# Patient Record
Sex: MALE | Race: WHITE | NOT HISPANIC OR LATINO | Employment: PART TIME | URBAN - METROPOLITAN AREA
[De-identification: names, ages, dates, MRNs, and addresses within clinical notes are randomized per-mention and may not be internally consistent; named-entity substitution may affect disease eponyms.]

---

## 2018-05-15 ENCOUNTER — OFFICE VISIT (OUTPATIENT)
Dept: UROLOGY | Facility: MEDICAL CENTER | Age: 73
End: 2018-05-15
Payer: MEDICARE

## 2018-05-15 VITALS
DIASTOLIC BLOOD PRESSURE: 76 MMHG | SYSTOLIC BLOOD PRESSURE: 120 MMHG | BODY MASS INDEX: 29.19 KG/M2 | HEIGHT: 67 IN | WEIGHT: 186 LBS

## 2018-05-15 DIAGNOSIS — N40.2 PROSTATE NODULE: ICD-10-CM

## 2018-05-15 DIAGNOSIS — N48.1 BALANITIS: ICD-10-CM

## 2018-05-15 DIAGNOSIS — N52.01 ERECTILE DYSFUNCTION DUE TO ARTERIAL INSUFFICIENCY: ICD-10-CM

## 2018-05-15 DIAGNOSIS — R97.20 ELEVATED PSA: Primary | ICD-10-CM

## 2018-05-15 LAB
SL AMB  POCT GLUCOSE, UA: 500
SL AMB LEUKOCYTE ESTERASE,UA: ABNORMAL
SL AMB POCT BILIRUBIN,UA: ABNORMAL
SL AMB POCT BLOOD,UA: ABNORMAL
SL AMB POCT CLARITY,UA: CLEAR
SL AMB POCT COLOR,UA: YELLOW
SL AMB POCT KETONES,UA: ABNORMAL
SL AMB POCT NITRITE,UA: ABNORMAL
SL AMB POCT PH,UA: ABNORMAL
SL AMB POCT SPECIFIC GRAVITY,UA: 5
SL AMB POCT URINE PROTEIN: 30
SL AMB POCT UROBILINOGEN: 0.2

## 2018-05-15 PROCEDURE — 99214 OFFICE O/P EST MOD 30 MIN: CPT | Performed by: UROLOGY

## 2018-05-15 PROCEDURE — 81003 URINALYSIS AUTO W/O SCOPE: CPT | Performed by: UROLOGY

## 2018-05-15 RX ORDER — FENOFIBRATE 145 MG/1
145 TABLET, COATED ORAL DAILY
COMMUNITY

## 2018-05-15 RX ORDER — LISINOPRIL AND HYDROCHLOROTHIAZIDE 12.5; 1 MG/1; MG/1
1 TABLET ORAL DAILY
COMMUNITY

## 2018-05-15 NOTE — PROGRESS NOTES
[address] [facility]  [facility]  [address] [facility] [place]  [place], [address] [facility]  [phone]  www  [facility]  org      NAME: Marga Briones  AGE: 68 y o  SEX: male  : 1945   MRN: [id number]    DATE: 5/15/2018  TIME: 3:56 PM    Assessment and Plan:  Elevated PSA status post biopsy 2016, which showed inflammation only  He also has a chronically suspicious digital rectal exam   We will check another PSA in 1 year  Continue use the triamcinolone as needed and avoid circumcision if possible  Chief Complaint   No chief complaint on file  History of Present Illness     had trus/bx 10/18/2016 for PSA of 7 3 - one focus of acute inflammation, one focus of high grade PIN  His PSA dropped to 1 2 before the bx  PSA down to 1 4 last OV, now 2 69  He has undergone TURP in   He has a history of balanitis and occasionally has trouble retracting the foreskin  He uses triamcinolone p r n  which seems to help  He inquired about circumcision, which I do not recommend if the triamcinolone is working for him  Erectile dysfunction:  Viagra is too expensive, and it was not working very well last time used  He has come to terms with this and does not require further treatment  Urinalysis is negative except for 500 glucose  The following portions of the patient's history were reviewed and updated as appropriate: allergies, current medications, past family history, past medical history, past social history, past surgical history and problem list     Review of Systems   Review of Systems   Genitourinary: Negative  Active Problem List   There is no problem list on file for this patient  Objective   /76   Ht 5' 7" (1 702 m)   Wt 84 4 kg (186 lb)   BMI 29 13 kg/m²     Physical Exam   Constitutional: He is oriented to person, place, and time  He appears well-developed and well-nourished     HENT:   Head: Normocephalic and atraumatic  Eyes: EOM are normal    Neck: Normal range of motion  Pulmonary/Chest: Effort normal    Genitourinary:   Genitourinary Comments: On this year's exam, the right side of his prostate is no longer as firm as it was before  However he has a left-sided nodule that actually feels like calcification  Otherwise normal    Musculoskeletal: Normal range of motion  Neurological: He is alert and oriented to person, place, and time  Skin: Skin is warm and dry  Psychiatric: He has a normal mood and affect   His behavior is normal  Judgment and thought content normal            Current Medications     Current Outpatient Prescriptions:     fenofibrate (TRICOR) 145 mg tablet, Take 145 mg by mouth daily, Disp: , Rfl:     lisinopril-hydrochlorothiazide (PRINZIDE,ZESTORETIC) 10-12 5 MG per tablet, Take 1 tablet by mouth daily, Disp: , Rfl:     metFORMIN (GLUCOPHAGE) 500 mg tablet, , Disp: , Rfl:         Ortiz Dorado MD

## 2018-05-15 NOTE — LETTER
May 15, 2018     Shlomo Prasad MD  [address]    Patient: Eduar Hartley   YOB: 1945   Date of Visit: 5/15/2018       Dear Dr Maru Werner: Thank you for referring Selene Gracia to me for evaluation  Below are my notes for this consultation  If you have questions, please do not hesitate to call me  I look forward to following your patient along with you  Sincerely,        Rodrigo Barger MD        CC: No Recipients  Rodrigo Barger MD  5/15/2018  4:08 PM  Sign at close encounter  [address] [facility]  [address], [address] [place] [id number]  www  [facility]  org      NAME: Eduar Hartley  AGE: 68 y o  SEX: male  : 1945   MRN: [id number]    DATE: 5/15/2018  TIME: 3:56 PM    Assessment and Plan:  Elevated PSA status post biopsy 2016, which showed inflammation only  He also has a chronically suspicious digital rectal exam   We will check another PSA in 1 year  Chief Complaint   No chief complaint on file  History of Present Illness     had trus/bx 10/18/2016 for PSA of 7 3 - one focus of acute inflammation, one focus of high grade PIN  His PSA dropped to 1 2 before the bx  PSA down to 1 4 last OV, now 2 69  He has undergone TURP in   Urinalysis is negative except for 500 glucose  The following portions of the patient's history were reviewed and updated as appropriate: allergies, current medications, past family history, past medical history, past social history, past surgical history and problem list     Review of Systems   Review of Systems   Genitourinary: Negative  Active Problem List   There is no problem list on file for this patient  Objective   /76   Ht 5' 7" (1 702 m)   Wt 84 4 kg (186 lb)   BMI 29 13 kg/m²      Physical Exam   Constitutional: He is oriented to person, place, and time   He appears well-developed and well-nourished  HENT:   Head: Normocephalic and atraumatic  Eyes: EOM are normal    Neck: Normal range of motion  Pulmonary/Chest: Effort normal    Genitourinary:   Genitourinary Comments: On this year's exam, the right side of his prostate is no longer as firm as it was before  However he has a left-sided nodule that actually feels like calcification  Otherwise normal    Musculoskeletal: Normal range of motion  Neurological: He is alert and oriented to person, place, and time  Skin: Skin is warm and dry  Psychiatric: He has a normal mood and affect   His behavior is normal  Judgment and thought content normal            Current Medications     Current Outpatient Prescriptions:     fenofibrate (TRICOR) 145 mg tablet, Take 145 mg by mouth daily, Disp: , Rfl:     lisinopril-hydrochlorothiazide (PRINZIDE,ZESTORETIC) 10-12 5 MG per tablet, Take 1 tablet by mouth daily, Disp: , Rfl:     metFORMIN (GLUCOPHAGE) 500 mg tablet, , Disp: , Rfl:         Karen Gagnon MD

## 2019-05-20 ENCOUNTER — OFFICE VISIT (OUTPATIENT)
Dept: UROLOGY | Facility: MEDICAL CENTER | Age: 74
End: 2019-05-20
Payer: MEDICARE

## 2019-05-20 VITALS
DIASTOLIC BLOOD PRESSURE: 70 MMHG | WEIGHT: 183 LBS | HEART RATE: 72 BPM | BODY MASS INDEX: 28.72 KG/M2 | HEIGHT: 67 IN | SYSTOLIC BLOOD PRESSURE: 122 MMHG

## 2019-05-20 DIAGNOSIS — N13.8 BPH WITH OBSTRUCTION/LOWER URINARY TRACT SYMPTOMS: ICD-10-CM

## 2019-05-20 DIAGNOSIS — N48.1 BALANITIS: ICD-10-CM

## 2019-05-20 DIAGNOSIS — R68.89 ABNORMAL DIGITAL RECTAL EXAM: ICD-10-CM

## 2019-05-20 DIAGNOSIS — R97.20 ELEVATED PSA: Primary | ICD-10-CM

## 2019-05-20 DIAGNOSIS — Z87.898 HISTORY OF ELEVATED PSA: ICD-10-CM

## 2019-05-20 DIAGNOSIS — N40.1 BPH WITH OBSTRUCTION/LOWER URINARY TRACT SYMPTOMS: ICD-10-CM

## 2019-05-20 DIAGNOSIS — R31.29 MICROSCOPIC HEMATURIA: ICD-10-CM

## 2019-05-20 LAB
SL AMB  POCT GLUCOSE, UA: ABNORMAL
SL AMB LEUKOCYTE ESTERASE,UA: ABNORMAL
SL AMB POCT BILIRUBIN,UA: ABNORMAL
SL AMB POCT BLOOD,UA: ABNORMAL
SL AMB POCT CLARITY,UA: CLEAR
SL AMB POCT COLOR,UA: YELLOW
SL AMB POCT KETONES,UA: ABNORMAL
SL AMB POCT NITRITE,UA: ABNORMAL
SL AMB POCT PH,UA: 5
SL AMB POCT SPECIFIC GRAVITY,UA: 1.01
SL AMB POCT URINE PROTEIN: ABNORMAL
SL AMB POCT UROBILINOGEN: 0.2

## 2019-05-20 PROCEDURE — 99214 OFFICE O/P EST MOD 30 MIN: CPT | Performed by: UROLOGY

## 2019-05-20 PROCEDURE — 81003 URINALYSIS AUTO W/O SCOPE: CPT | Performed by: UROLOGY

## 2019-05-20 RX ORDER — BLOOD SUGAR DIAGNOSTIC
STRIP MISCELLANEOUS
Refills: 0 | COMMUNITY
Start: 2019-03-15

## 2019-05-20 RX ORDER — NYSTATIN AND TRIAMCINOLONE ACETONIDE 100000; 1 [USP'U]/G; MG/G
OINTMENT TOPICAL 2 TIMES DAILY
Qty: 30 G | Refills: 0 | Status: SHIPPED | OUTPATIENT
Start: 2019-05-20 | End: 2020-05-26 | Stop reason: SDUPTHER

## 2019-05-22 ENCOUNTER — HOSPITAL ENCOUNTER (OUTPATIENT)
Dept: ULTRASOUND IMAGING | Facility: MEDICAL CENTER | Age: 74
Discharge: HOME | End: 2019-05-22
Attending: UROLOGY
Payer: MEDICARE

## 2019-05-22 DIAGNOSIS — R31.29 MICROSCOPIC HEMATURIA: ICD-10-CM

## 2019-05-22 PROCEDURE — 76770 US EXAM ABDO BACK WALL COMP: CPT

## 2019-05-28 ENCOUNTER — TELEPHONE (OUTPATIENT)
Dept: UROLOGY | Facility: MEDICAL CENTER | Age: 74
End: 2019-05-28

## 2020-05-26 ENCOUNTER — TELEMEDICINE (OUTPATIENT)
Dept: UROLOGY | Facility: MEDICAL CENTER | Age: 75
End: 2020-05-26
Payer: MEDICARE

## 2020-05-26 DIAGNOSIS — R97.20 ELEVATED PSA: Primary | ICD-10-CM

## 2020-05-26 DIAGNOSIS — N48.1 BALANITIS: ICD-10-CM

## 2020-05-26 PROCEDURE — 99441 PR PHYS/QHP TELEPHONE EVALUATION 5-10 MIN: CPT | Performed by: UROLOGY

## 2020-05-26 RX ORDER — NYSTATIN AND TRIAMCINOLONE ACETONIDE 100000; 1 [USP'U]/G; MG/G
OINTMENT TOPICAL 2 TIMES DAILY
Qty: 30 G | Refills: 0 | Status: SHIPPED | OUTPATIENT
Start: 2020-05-26

## 2021-04-16 ENCOUNTER — TELEPHONE (OUTPATIENT)
Dept: UROLOGY | Facility: AMBULATORY SURGERY CENTER | Age: 76
End: 2021-04-16

## 2021-04-16 DIAGNOSIS — R97.20 ELEVATED PSA: Primary | ICD-10-CM

## 2021-04-16 NOTE — TELEPHONE ENCOUNTER
Patient managed by Dr Mercedez Willson ([place]) patient called in to schedule his yearly fup (7/12/21) patient will need an updated psa  Patient requested script to be mailed to his home   Patient can be reached at [phone]

## 2021-07-12 ENCOUNTER — OFFICE VISIT (OUTPATIENT)
Dept: UROLOGY | Facility: MEDICAL CENTER | Age: 76
End: 2021-07-12
Payer: MEDICARE

## 2021-07-12 VITALS
HEIGHT: 67 IN | SYSTOLIC BLOOD PRESSURE: 112 MMHG | WEIGHT: 180 LBS | DIASTOLIC BLOOD PRESSURE: 72 MMHG | BODY MASS INDEX: 28.25 KG/M2

## 2021-07-12 DIAGNOSIS — R97.20 ELEVATED PROSTATE SPECIFIC ANTIGEN (PSA): ICD-10-CM

## 2021-07-12 DIAGNOSIS — Z87.898 HISTORY OF ELEVATED PSA: ICD-10-CM

## 2021-07-12 DIAGNOSIS — R97.20 ELEVATED PSA: Primary | ICD-10-CM

## 2021-07-12 LAB
SL AMB  POCT GLUCOSE, UA: NORMAL
SL AMB LEUKOCYTE ESTERASE,UA: NORMAL
SL AMB POCT BILIRUBIN,UA: NORMAL
SL AMB POCT BLOOD,UA: NORMAL
SL AMB POCT CLARITY,UA: CLEAR
SL AMB POCT COLOR,UA: YELLOW
SL AMB POCT KETONES,UA: NORMAL
SL AMB POCT NITRITE,UA: NORMAL
SL AMB POCT PH,UA: 5
SL AMB POCT SPECIFIC GRAVITY,UA: >=1.03
SL AMB POCT URINE PROTEIN: NORMAL
SL AMB POCT UROBILINOGEN: 0.2

## 2021-07-12 PROCEDURE — 99214 OFFICE O/P EST MOD 30 MIN: CPT | Performed by: UROLOGY

## 2021-07-12 PROCEDURE — 81003 URINALYSIS AUTO W/O SCOPE: CPT | Performed by: UROLOGY

## 2021-07-12 RX ORDER — ATORVASTATIN CALCIUM 80 MG/1
80 TABLET, FILM COATED ORAL DAILY
COMMUNITY
Start: 2021-05-24

## 2021-07-12 NOTE — PROGRESS NOTES
[address] [facility]  [facility]  [address] [facility], [address] [place] [address]  [phone]  www  [facility]  org      NAME: Drew Montanez  AGE: 68 y o  SEX: male  : 1945   MRN: [id number]    DATE: 2021  TIME: 10:24 AM    Assessment and Plan:    History of elevated PSA, normalized and stable  Chronically abnormal digital rectal exam no change  Status post negative biopsy 2016  Voiding well  From my standpoint he is doing well  Recheck in 2 years  Chief Complaint   No chief complaint on file  History of Present Illness     Follow-up history of elevated PSA that has normalized- also has a chronically abnormal digital rectal exam   Status post negative biopsy 2016  Also has a history of BPH with obstruction status post TURP in   PSA was 2 22 2020, PSA was 2 73 as of 2021  Also history history of balanoposthitis which he uses Mycolog cream occasionally 4  He has not had a problem with balanoposthitis recently  Has some urgency but no frequency  AUA score is 5  Quality of life is 1  The following portions of the patient's history were reviewed and updated as appropriate: allergies, current medications, past family history, past medical history, past social history, past surgical history and problem list   Past Medical History:   Diagnosis Date    Balanoposthitis     BPH without obstruction/lower urinary tract symptoms     Elevated PSA     Nodular prostate without lower urinary tract symptoms      Past Surgical History:   Procedure Laterality Date    PROSTATE BIOPSY      TRANSURETHRAL RESECTION OF PROSTATE  2007     shoulder  Review of Systems   Review of Systems   Constitutional: Negative for fever  Respiratory: Negative for shortness of breath  Cardiovascular: Negative for chest pain  Genitourinary: Positive for urgency  Negative for difficulty urinating, dysuria and frequency         Active Problem List   There is no problem list on file for this patient  Objective   /72   Ht 5' 7" (1 702 m)   Wt 81 6 kg (180 lb)   BMI 28 19 kg/m²     Physical Exam  Constitutional:       Appearance: Normal appearance  HENT:      Head: Normocephalic and atraumatic  Eyes:      Extraocular Movements: Extraocular movements intact  Pulmonary:      Effort: Pulmonary effort is normal    Genitourinary:     Comments: Uncircumcised phallus, with some erythema consistent with chronic balanitis  No suspicious lesions  Testicles are descended bilaterally and are within normal limits  Rectal exam reveals normal tone no masses and his prostate is about 40-50 g, with right-sided asymmetry nodularity on the right which is chronic and unchanged  Musculoskeletal:         General: Normal range of motion  Cervical back: Normal range of motion  Skin:     Coloration: Skin is not jaundiced or pale  Neurological:      General: No focal deficit present  Mental Status: He is alert and oriented to person, place, and time  Psychiatric:         Mood and Affect: Mood normal          Behavior: Behavior normal          Thought Content:  Thought content normal          Judgment: Judgment normal              Current Medications     Current Outpatient Medications:     atorvastatin (LIPITOR) 80 mg tablet, Take 80 mg by mouth daily, Disp: , Rfl:     fenofibrate (TRICOR) 145 mg tablet, Take 145 mg by mouth daily, Disp: , Rfl:     FREESTYLE TEST STRIPS test strip, , Disp: , Rfl: 0    lisinopril-hydrochlorothiazide (PRINZIDE,ZESTORETIC) 10-12 5 MG per tablet, Take 1 tablet by mouth daily, Disp: , Rfl:     metFORMIN (GLUCOPHAGE) 500 mg tablet, , Disp: , Rfl:     nystatin-triamcinolone (MYCOLOG-II) ointment, Apply topically 2 (two) times a day (Patient taking differently: Apply topically as needed ), Disp: 30 g, Rfl: 0        Pernell Burns MD

## 2023-01-06 ENCOUNTER — TELEPHONE (OUTPATIENT)
Dept: OTHER | Facility: OTHER | Age: 78
End: 2023-01-06

## 2023-01-06 NOTE — TELEPHONE ENCOUNTER
Pt called in to set up an appt  He says he can't hold his urine too well and he leaks a little bit  He is requesting a call back

## 2023-01-06 NOTE — TELEPHONE ENCOUNTER
Spoke with pt and scheduled appt for 1-19-23 at 8:30am with dr Delta Rocha at [facility] office  Pt of dr Milvia Junior but pt does not want to travel to [facility]

## 2023-01-19 ENCOUNTER — OFFICE VISIT (OUTPATIENT)
Dept: UROLOGY | Facility: MEDICAL CENTER | Age: 78
End: 2023-01-19

## 2023-01-19 VITALS
HEIGHT: 67 IN | OXYGEN SATURATION: 99 % | HEART RATE: 64 BPM | BODY MASS INDEX: 28.19 KG/M2 | DIASTOLIC BLOOD PRESSURE: 74 MMHG | WEIGHT: 179.6 LBS | SYSTOLIC BLOOD PRESSURE: 126 MMHG

## 2023-01-19 DIAGNOSIS — N48.1 BALANITIS: ICD-10-CM

## 2023-01-19 DIAGNOSIS — R97.20 ELEVATED PSA: ICD-10-CM

## 2023-01-19 DIAGNOSIS — R39.15 URINARY URGENCY: Primary | ICD-10-CM

## 2023-01-19 LAB
BACTERIA UR QL AUTO: ABNORMAL /HPF
BILIRUB UR QL STRIP: NEGATIVE
CLARITY UR: CLEAR
COLOR UR: YELLOW
GLUCOSE UR STRIP-MCNC: ABNORMAL MG/DL
HGB UR QL STRIP.AUTO: ABNORMAL
KETONES UR STRIP-MCNC: ABNORMAL MG/DL
LEUKOCYTE ESTERASE UR QL STRIP: NEGATIVE
NITRITE UR QL STRIP: NEGATIVE
NON-SQ EPI CELLS URNS QL MICRO: ABNORMAL /HPF
PH UR STRIP.AUTO: 5.5 [PH]
POST-VOID RESIDUAL VOLUME, ML POC: 20 ML
PROT UR STRIP-MCNC: ABNORMAL MG/DL
RBC #/AREA URNS AUTO: ABNORMAL /HPF
SP GR UR STRIP.AUTO: >=1.03 (ref 1–1.03)
UROBILINOGEN UR STRIP-ACNC: <2 MG/DL
WBC #/AREA URNS AUTO: ABNORMAL /HPF

## 2023-01-19 RX ORDER — GLIMEPIRIDE 2 MG/1
2 TABLET ORAL
COMMUNITY

## 2023-01-19 RX ORDER — MIRABEGRON 25 MG/1
25 TABLET, FILM COATED, EXTENDED RELEASE ORAL DAILY
Qty: 30 TABLET | Refills: 11 | Status: SHIPPED | OUTPATIENT
Start: 2023-01-19

## 2023-01-19 RX ORDER — NYSTATIN AND TRIAMCINOLONE ACETONIDE 100000; 1 [USP'U]/G; MG/G
OINTMENT TOPICAL 2 TIMES DAILY
Qty: 30 G | Refills: 0 | Status: SHIPPED | OUTPATIENT
Start: 2023-01-19

## 2023-01-19 NOTE — PROGRESS NOTES
1/19/2023    Pato Schulte  1945  [id number]      Chief Complaint: Follow-up for urinary urgency    History of Present Illness  Pato Schulte is a 68 y o  male previously followed with Dr Jefferson Sawant presenting for evaluation of urinary urgency with incontinence  He was last seen by Dr Jefferson Sawant in July 2021  He has a history of elevated PSA that essentially normalized  He had a negative prostate biopsy in October 2016  PSAs has been in the high twos most recently  He has a history of nocturia in 2007  He has had increasing urgency and is not able to make it to the bathroom if he deteriorates urination  He will have an incontinence episode if he does not go to the bathroom immediately  He also endorses postvoid dribbling  He has urinary frequency but denies dysuria or gross hematuria  He has nocturia 1-2 times  Denies history of recent infections  He drinks a lot of diet [place] [place] iced tea  He states that he drinks more of this than water  Last PSA was 2 99 from June 2022  Bladder Scan PVR: 20mL    AUA SYMPTOM SCORE    Flowsheet Row Most Recent Value   AUA SYMPTOM SCORE    How often have you had a sensation of not emptying your bladder completely after you finished urinating? 4   How often have you had to urinate again less than two hours after you finished urinating? 3   How often have you found you stopped and started again several times when you urinate? 1   How often have you found it difficult to postpone urination? 5   How often have you had a weak urinary stream? 2   How often have you had to push or strain to begin urination? 2   How many times did you most typically get up to urinate from the time you went to bed at night until the time you got up in the morning?  2   Quality of Life: If you were to spend the rest of your life with your urinary condition just the way it is now, how would you feel about that? 3   AUA SYMPTOM SCORE 19          Past Medical History  Past Medical History: Diagnosis Date   • Balanoposthitis    • BPH without obstruction/lower urinary tract symptoms    • Elevated PSA    • Nodular prostate without lower urinary tract symptoms        Past Surgical History  Past Surgical History:   Procedure Laterality Date   • PROSTATE BIOPSY     • TRANSURETHRAL RESECTION OF PROSTATE  2007       Physical Exam  /74 (BP Location: Left arm, Patient Position: Sitting, Cuff Size: Adult)   Pulse 64   Ht 5' 7" (1 702 m)   Wt 81 5 kg (179 lb 9 6 oz)   SpO2 99%   BMI 28 13 kg/m²     General:  Healthy appearing male in no acute distress  Head:  Normocephalic, atraumatic  Cardiovascular:  Regular rate  Respiratory:  Patient has unlabored respirations  Assessment  61-year-old male with history of BPH status post TURP in 2007, urinary urgency with urgency incontinence, history of intermittent balanoposthitis, and previous history of elevated PSA  His AUA symptom score is 19  Urgency incontinence is most bothersome to him at the moment he would like this addressed  He is emptying his bladder well  I advised that iced tea is caffeinated and may be contributing to his urinary urgency given that he is drinking so much tea  I recommend that he reduce his intake to help with the symptoms  I also recommend that he trial Myrbetriq 25 mg daily  The side effect profile is much more tolerable  I do not want to use anticholinergics in the patient his age given the side effects of constipation and concern for possible dementia  The patient reports intermittent bladder posthitis  He is not currently having any issues but would like to have a prescription refill so that this medication is available to him  He is due for PSA in June  I will see him back then to discuss his symptoms    Plan  1  Prescription for Myrbetriq 25 mg daily sent to pharmacy  2  Refill for Mycolog-II sent to pharmacy  3  Due for PSA June 2023  4   Return to clinic in 5 months with a PSA prior    Patrizia Renetta Vanessa, [facility]    Portions of the above record have been created with voice recognition software  Occasional wrong word or "sound alike" substitution may have occurred due to the inherent limitations of voice recognition software  Please read the chart carefully and recognize, using context, where substitution may have occurred  For further clarification, please contact me directly

## 2023-01-20 LAB — BACTERIA UR CULT: NORMAL

## 2023-06-22 ENCOUNTER — OFFICE VISIT (OUTPATIENT)
Dept: UROLOGY | Facility: MEDICAL CENTER | Age: 78
End: 2023-06-22
Payer: MEDICARE

## 2023-06-22 VITALS
BODY MASS INDEX: 27.5 KG/M2 | HEART RATE: 73 BPM | HEIGHT: 67 IN | OXYGEN SATURATION: 100 % | DIASTOLIC BLOOD PRESSURE: 76 MMHG | WEIGHT: 175.2 LBS | SYSTOLIC BLOOD PRESSURE: 120 MMHG

## 2023-06-22 DIAGNOSIS — N39.41 BENIGN PROSTATIC HYPERPLASIA (BPH) WITH URINARY URGE INCONTINENCE: Primary | ICD-10-CM

## 2023-06-22 DIAGNOSIS — N40.1 BENIGN PROSTATIC HYPERPLASIA (BPH) WITH URINARY URGE INCONTINENCE: Primary | ICD-10-CM

## 2023-06-22 DIAGNOSIS — Z12.5 PROSTATE CANCER SCREENING: ICD-10-CM

## 2023-06-22 PROBLEM — R39.15 URINARY URGENCY: Status: ACTIVE | Noted: 2023-06-22

## 2023-06-22 LAB — POST-VOID RESIDUAL VOLUME, ML POC: 0 ML

## 2023-06-22 PROCEDURE — 51798 US URINE CAPACITY MEASURE: CPT | Performed by: STUDENT IN AN ORGANIZED HEALTH CARE EDUCATION/TRAINING PROGRAM

## 2023-06-22 PROCEDURE — 99214 OFFICE O/P EST MOD 30 MIN: CPT | Performed by: STUDENT IN AN ORGANIZED HEALTH CARE EDUCATION/TRAINING PROGRAM

## 2023-06-22 NOTE — PROGRESS NOTES
Urology Ambulatory Progress Note  6/22/2023    Dana Gusman  1945  [id number]      Assessment/Plan:  Problem List Items Addressed This Visit        Other    Prostate cancer screening     Assessment:  I discussed with the patient continued prostate cancer screening with annual PSA versus discontinuing PSA checks given his age and persistent stability of PSA  He prefers to continue monitoring which I think is reasonable given his good state of health at his age  Plan:  • Return to clinic in 1 year with PSA prior  Relevant Orders    PSA, Total Screen    Urinary urgency - Primary     Assessment:  BPH with lower urinary tract symptoms predominantly irritative  Patient is doing well with behavioral modifications and has not required pharmacologic therapy  He is satisfied with his urinary pattern  I think we can switch to annual follow-up  Plan:  • Return to clinic in 1 year                Chief Complaint: Follow-up for BPH with LUTS and prostate cancer screening    History of Present Illness  Dana Gusman is a 66 y o  male presenting for evaluation of BPH/LUTS and prostate cancer screening  He has a history of elevated PSA that essentially normalized  He had a negative prostate biopsy in October 2016  PSAs has been in the high twos most recently  He has a history of nocturia and underwnet TURP in 2007  During my visit with him in January 2023 he was complaining of urgency, urgency incontinence, and nocturia  I prescribed Myrbetriq to help with irritative voiding symptoms and he returns to discuss his symptoms  He has significantly decreased his intake of [place] citizen [place] iced tea and his symptoms have markedly improved  He no longer has incontinence  He never took Myrbetriq because this was cost prohibitive  He is satisfied with his urinary pattern at present  PSA June 8, 2023 returned at 2 27 which is in range with prior PSA values  Previous PSA was 2 99 from June 2022         Past Medical "History  Past Medical History:   Diagnosis Date   • Balanoposthitis    • BPH without obstruction/lower urinary tract symptoms    • Elevated PSA    • Nodular prostate without lower urinary tract symptoms        Past Surgical History  Past Surgical History:   Procedure Laterality Date   • PROSTATE BIOPSY     • TRANSURETHRAL RESECTION OF PROSTATE  2007       Physical Exam  /76 (BP Location: Left arm, Patient Position: Sitting, Cuff Size: Adult)   Pulse 73   Ht 5' 7\" (1 702 m)   Wt 79 5 kg (175 lb 3 2 oz)   SpO2 100%   BMI 27 44 kg/m²     General:  Healthy appearing male in no acute distress  Head:  Normocephalic, atraumatic  Cardiovascular:  Regular rate  Respiratory:  Patient has unlabored respirations  Elvin Gill MD  [facility]    Portions of the above record have been created with voice recognition software  Occasional wrong word or \"sound alike\" substitution may have occurred due to the inherent limitations of voice recognition software  Please read the chart carefully and recognize, using context, where substitution may have occurred  For further clarification, please contact me directly     "

## 2023-06-22 NOTE — ASSESSMENT & PLAN NOTE
01/06/22 1:08 PM     See documentation in the [employer] Smart[employer]       Carrie Mcnamara Assessment:  BPH with lower urinary tract symptoms predominantly irritative  Patient is doing well with behavioral modifications and has not required pharmacologic therapy  He is satisfied with his urinary pattern  I think we can switch to annual follow-up      Plan:  • Return to clinic in 1 year

## 2023-06-22 NOTE — ASSESSMENT & PLAN NOTE
Assessment:  I discussed with the patient continued prostate cancer screening with annual PSA versus discontinuing PSA checks given his age and persistent stability of PSA  He prefers to continue monitoring which I think is reasonable given his good state of health at his age  Plan:  • Return to clinic in 1 year with PSA prior

## 2023-08-21 PROBLEM — Z12.5 PROSTATE CANCER SCREENING: Status: RESOLVED | Noted: 2023-06-22 | Resolved: 2023-08-21

## 2024-12-06 DIAGNOSIS — N48.1 BALANITIS: ICD-10-CM

## 2024-12-06 RX ORDER — NYSTATIN AND TRIAMCINOLONE ACETONIDE 100000; 1 [USP'U]/G; MG/G
OINTMENT TOPICAL 2 TIMES DAILY
Qty: 30 G | Refills: 0 | Status: SHIPPED | OUTPATIENT
Start: 2024-12-06

## 2024-12-06 NOTE — TELEPHONE ENCOUNTER
Reason for call:   [x] Refill-Patient's wife calling for refill. Patient has not been seen since 6/22/23. Made aware that patient may need to be seen for an appointment. Previous provider no longer available.   [] Prior Auth  [] Other:     Office:   [] PCP/Provider -   [x] Specialty/Provider - [facility] [place]     Medication: nystatin-triamcinolone (MYCOLOG-II) ointment     Dose/Frequency: Apply topically 2 (two) times a day     Quantity: 30g    Pharmacy: [id number] [address] [facility]     Does the patient have enough for 3 days?   [] Yes   [] No - Send as HP to POD